# Patient Record
Sex: MALE | Race: WHITE | URBAN - METROPOLITAN AREA
[De-identification: names, ages, dates, MRNs, and addresses within clinical notes are randomized per-mention and may not be internally consistent; named-entity substitution may affect disease eponyms.]

---

## 2021-05-30 ENCOUNTER — RECORDS - HEALTHEAST (OUTPATIENT)
Dept: ADMINISTRATIVE | Facility: CLINIC | Age: 71
End: 2021-05-30

## 2021-05-31 ENCOUNTER — RECORDS - HEALTHEAST (OUTPATIENT)
Dept: ADMINISTRATIVE | Facility: CLINIC | Age: 71
End: 2021-05-31

## 2024-12-12 ENCOUNTER — TRANSITIONAL CARE UNIT VISIT (OUTPATIENT)
Dept: GERIATRICS | Facility: CLINIC | Age: 74
End: 2024-12-12
Payer: COMMERCIAL

## 2024-12-12 ENCOUNTER — DOCUMENTATION ONLY (OUTPATIENT)
Dept: GERIATRICS | Facility: CLINIC | Age: 74
End: 2024-12-12

## 2024-12-12 ENCOUNTER — LAB REQUISITION (OUTPATIENT)
Dept: LAB | Facility: CLINIC | Age: 74
End: 2024-12-12
Payer: COMMERCIAL

## 2024-12-12 VITALS
SYSTOLIC BLOOD PRESSURE: 108 MMHG | BODY MASS INDEX: 45.1 KG/M2 | TEMPERATURE: 97.7 F | WEIGHT: 315 LBS | HEIGHT: 70 IN | HEART RATE: 80 BPM | OXYGEN SATURATION: 94 % | DIASTOLIC BLOOD PRESSURE: 73 MMHG | RESPIRATION RATE: 18 BRPM

## 2024-12-12 DIAGNOSIS — R41.9 NEUROCOGNITIVE DISORDER: ICD-10-CM

## 2024-12-12 DIAGNOSIS — I61.9 INTRAPARENCHYMAL HEMORRHAGE OF BRAIN (H): Primary | ICD-10-CM

## 2024-12-12 DIAGNOSIS — E66.01 MORBID OBESITY (H): ICD-10-CM

## 2024-12-12 DIAGNOSIS — E11.00 TYPE 2 DIABETES MELLITUS WITH HYPEROSMOLARITY WITHOUT COMA, WITHOUT LONG-TERM CURRENT USE OF INSULIN (H): ICD-10-CM

## 2024-12-12 DIAGNOSIS — I10 ESSENTIAL (PRIMARY) HYPERTENSION: ICD-10-CM

## 2024-12-12 DIAGNOSIS — I10 PRIMARY HYPERTENSION: ICD-10-CM

## 2024-12-12 DIAGNOSIS — F17.200 NICOTINE USE DISORDER: ICD-10-CM

## 2024-12-12 PROBLEM — E78.5 DYSLIPIDEMIA: Status: ACTIVE | Noted: 2023-05-10

## 2024-12-12 PROBLEM — E11.9 DIABETES (H): Chronic | Status: ACTIVE | Noted: 2024-12-02

## 2024-12-12 PROBLEM — L85.3 XEROSIS OF SKIN: Status: ACTIVE | Noted: 2024-05-21

## 2024-12-12 PROBLEM — S81.801A LEG WOUND, RIGHT: Status: ACTIVE | Noted: 2024-05-21

## 2024-12-12 PROBLEM — I87.8 VENOUS STASIS: Status: ACTIVE | Noted: 2024-12-11

## 2024-12-12 PROBLEM — I26.02 ACUTE SADDLE PULMONARY EMBOLISM WITH ACUTE COR PULMONALE (H): Status: ACTIVE | Noted: 2024-05-03

## 2024-12-12 PROBLEM — R60.0 LOWER EXTREMITY EDEMA: Status: ACTIVE | Noted: 2024-05-21

## 2024-12-12 PROBLEM — E29.1 HYPOGONADISM MALE: Status: ACTIVE | Noted: 2019-11-12

## 2024-12-12 PROBLEM — E11.9 TYPE 2 DIABETES MELLITUS WITHOUT COMPLICATION (H): Status: ACTIVE | Noted: 2019-12-19

## 2024-12-12 PROBLEM — R90.89 MIDLINE SHIFT OF BRAIN: Status: ACTIVE | Noted: 2024-12-02

## 2024-12-12 PROBLEM — G93.6 CEREBRAL EDEMA (H): Status: ACTIVE | Noted: 2024-12-02

## 2024-12-12 PROBLEM — Z96.653 STATUS POST BILATERAL KNEE REPLACEMENTS: Status: ACTIVE | Noted: 2024-01-09

## 2024-12-12 PROBLEM — B35.1 ONYCHOMYCOSIS: Status: ACTIVE | Noted: 2024-05-21

## 2024-12-12 PROBLEM — Z79.01 LONG TERM (CURRENT) USE OF ANTICOAGULANTS: Status: ACTIVE | Noted: 2024-12-11

## 2024-12-12 PROBLEM — J44.9 COPD (CHRONIC OBSTRUCTIVE PULMONARY DISEASE) (H): Status: ACTIVE | Noted: 2023-06-16

## 2024-12-12 PROBLEM — Z86.711 HISTORY OF PULMONARY EMBOLUS (PE): Status: ACTIVE | Noted: 2020-12-17

## 2024-12-12 RX ORDER — LEVOTHYROXINE SODIUM 200 UG/1
1.5 TABLET ORAL WEEKLY
COMMUNITY

## 2024-12-12 RX ORDER — HYDROCHLOROTHIAZIDE 12.5 MG/1
12.5 TABLET ORAL DAILY
COMMUNITY

## 2024-12-12 RX ORDER — ATORVASTATIN CALCIUM 20 MG/1
20 TABLET, FILM COATED ORAL DAILY
COMMUNITY

## 2024-12-12 RX ORDER — AMLODIPINE BESYLATE 10 MG/1
10 TABLET ORAL DAILY
COMMUNITY

## 2024-12-12 RX ORDER — LEVETIRACETAM 750 MG/1
750 TABLET ORAL 2 TIMES DAILY
COMMUNITY

## 2024-12-12 RX ORDER — LEVOTHYROXINE SODIUM 200 UG/1
200 TABLET ORAL DAILY
COMMUNITY

## 2024-12-12 RX ORDER — ESCITALOPRAM OXALATE 5 MG/1
5 TABLET ORAL DAILY
COMMUNITY

## 2024-12-12 RX ORDER — LOSARTAN POTASSIUM 50 MG/1
50 TABLET ORAL 2 TIMES DAILY
COMMUNITY

## 2024-12-12 NOTE — PROGRESS NOTES
[facility] GERIATRIC SERVICES       Patient Von Romero  MRN: [id number]        Reason for Visit     Chief Complaint   Patient presents with    Hospital F/U       Code Status     CPR/Full code     Assessment     Right temporal intraparenchymal hemorrhage with no underlying pathology identified status post EVD placement and subsequent removal  Discharged with outpatient follow-up with repeat imaging with neurosurgery as scheduled  All antiplatelet and anticoagulant medications are recommended to be held  Seizure prophylaxis on Keppra    Acute fall reported in the TCU this morning when he was self transferring.  No injuries have been reported  Monitor clinically and fall concerns discussed with staff    Hypertension with uncontrolled hypertension in the hospital  Discharged on losartan as well as amlodipine  HCTZ added to the regimen  Blood pressures noted to be on the low side suspect he is not eating and drinking enough   will monitor trends before adjusting medication    Neurocognitive disorder could be a sequelae of intracerebral hemorrhage versus Alzheimer's  Psych saw the patient  Started on Lexapro  Appears to be sleeping however will answer appropriately but does not open eyes for very long    Diabetes type 2 with a last A1c of 5.7  Monitor blood sugars  Continue p.o. metformin and insulin as ordered    History of tobacco abuse -currently not smoking    Chronic pain with opioid dependence he is on methadone  This has been discontinued and currently not on any significant pain medications    Morbid obesity BMI is greater than 47  Monitor weights    Generalized weakness  Discharged to the TCU for strengthening and rehab.  Had a fall this morning with no injuries reported.  Family is looking at alternative placement for him in another TCU        History     Patient is a very pleasant 74 year old male who is admitted to TCU  Patient was admitted with altered mental status and a fall  Imaging was consistent with  right parietal intracerebral hemorrhage  He was transferred and seen by neurosurgery and had a bedside ventricular drain placement done  Patient was extubated and weaned out of the ICU  Eventually discharged to the TCU with advised for an outpatient follow-up with neurosurgery for repeat imaging in 6 weeks  His anticoagulation and antiplatelet therapies are being held   Unfortunately fell again this morning  Family very upset and transferring patient to another facility if possible      Past Medical & Surgical History     PAST MEDICAL HISTORY:   Past Medical History:   Diagnosis Date    Hypercholesterolemia     Hypothyroid     Obesity     Obstructive sleep apnea       PAST SURGICAL HISTORY:   has a past surgical history that includes Ankle surgery (4497-1563); Spine surgery (3537-6324); knee surgery (1993, '04, '07); shoulder surgery (1995); knee surgery (2002, '06); and Release carpal tunnel (1999, '00).      Past Social History     Reviewed,  include ongoing history of tobacco abuse    Family History     Reviewed, and family history is not on file.    Medication List     Current Outpatient Medications   Medication Sig Dispense Refill    amLODIPine (NORVASC) 10 MG tablet Take 10 mg by mouth daily.      atorvastatin (LIPITOR) 20 MG tablet Take 20 mg by mouth daily.      escitalopram (LEXAPRO) 5 MG tablet Take 5 mg by mouth daily.      hydroCHLOROthiazide 12.5 MG tablet Take 12.5 mg by mouth daily.      insulin aspart (NOVOLOG PEN) 100 UNIT/ML pen Inject subcutaneously See Admin Instructions. Inject as per sliding  scale: if 150 - 199 = 2 units; 200 - 249 = 4 units; 250 -  299 = 6 units; 300 - 349 = 8 units; 350 - 399 = 10  units; 400+ = 12 units and notify MD, subcutaneously  with meals for Type 2 diabetes      insulin aspart (NOVOLOG PEN) 100 UNIT/ML pen Inject subcutaneously See Admin Instructions. ) Inject as per sliding  scale: if 200 - 249 = 2 units; 250 - 299 = 3 units; 300 -  349 = 4 units; 350 - 399 = 5  units; 400+ = 6 units and  call MD, subcutaneously at bedtime for Type 2  Diabetes      levETIRAcetam (KEPPRA) 750 MG tablet Take 750 mg by mouth 2 times daily.      levothyroxine (SYNTHROID/LEVOTHROID) 200 MCG tablet Take 200 mcg by mouth daily. Mon, Tues, Wed, Thurs, Fri, sat      levothyroxine (SYNTHROID/LEVOTHROID) 200 MCG tablet Take 1.5 tablets by mouth once a week. Sundays      losartan (COZAAR) 50 MG tablet Take 50 mg by mouth 2 times daily.      metFORMIN (GLUCOPHAGE) 500 MG tablet Take 1,000 mg by mouth 2 times daily (with meals).      naloxone (NARCAN) 1 mg/mL SUSP Take 1 mg by mouth 3 times daily.       No current facility-administered medications for this visit.      MED REC REQUIRED  Post Medication Reconciliation Status: discharge medications reconciled, continue medications without change       Allergies     Allergies   Allergen Reactions    Hydroxyzine Unknown and Other (See Comments)     Other Reaction(s): Mood swings    Mood swings       Review of Systems   A comprehensive review of 14 systems was done. Pertinent findings noted here and in history of present illness. All the rest negative.  Constitutional: Negative.  Negative for fever, chills, he has  activity change, appetite change and fatigue.   HENT: Negative for congestion and facial swelling.    Eyes: Negative for photophobia, redness and visual disturbance.   Respiratory: Negative for cough and chest tightness.    Cardiovascular: Negative for chest pain, palpitations and leg swelling.   Gastrointestinal: Negative for nausea, diarrhea, constipation, blood in stool and abdominal distention.   Genitourinary: Negative.    Musculoskeletal:   Fell this morning  Skin: Negative.    Neurological: Negative for dizziness, tremors, syncope, weakness, light-headedness and headaches.   Hematological: Does not bruise/bleed easily.   Psychiatric/Behavioral: Negative.  Appears to be sleepy and with minimal eye-opening      Physical Exam   /73    "Pulse 80   Temp 97.7  F (36.5  C)   Resp 18   Ht 1.778 m (5' 10\")   Wt 149.7 kg (330 lb)   SpO2 94%   BMI 47.35 kg/m     GENERAL: no acute distress. Cooperative in conversation.   HEENT: pupils are equal, round and reactive. Oral mucosa is moist and intact.  RESP:Chest symmetric. Regular respiratory rate. No stridor.  CVS S1-S2  ABD: Nondistended, soft.  Obese  EXTREMITIES: No lower extremity edema.   NEURO: non focal. Alert and oriented x3.   PSYCH: within normal limits. No depression or anxiety.  Appears to be sleepy with minimal eye-opening noted  SKIN: warm dry intact    Rash in skin folds  Scalp incision noted intact with sutures noted      Lab Results       CT head  Large 4.4 cm x 4.5 cm x 5.4 cm right temporal lobe hemorrhage with significant cerebral edema along with intraventricular hemorrhage. On coumarin, reversed with Kcentra.            Electronically signed by    Zara Shelley MD                        "

## 2024-12-12 NOTE — LETTER
12/12/2024      Von Romero  [address]  [place] [address]        M [facility] GERIATRIC SERVICES       Patient Von Romero  MRN: [id number]        Reason for Visit     Chief Complaint   Patient presents with     Hospital F/U       Code Status     CPR/Full code     Assessment     Right temporal intraparenchymal hemorrhage with no underlying pathology identified status post EVD placement and subsequent removal  Discharged with outpatient follow-up with repeat imaging with neurosurgery as scheduled  All antiplatelet and anticoagulant medications are recommended to be held  Seizure prophylaxis on Keppra    Acute fall reported in the TCU this morning when he was self transferring.  No injuries have been reported  Monitor clinically and fall concerns discussed with staff    Hypertension with uncontrolled hypertension in the hospital  Discharged on losartan as well as amlodipine  HCTZ added to the regimen  Blood pressures noted to be on the low side suspect he is not eating and drinking enough   will monitor trends before adjusting medication    Neurocognitive disorder could be a sequelae of intracerebral hemorrhage versus Alzheimer's  Psych saw the patient  Started on Lexapro  Appears to be sleeping however will answer appropriately but does not open eyes for very long    Diabetes type 2 with a last A1c of 5.7  Monitor blood sugars  Continue p.o. metformin and insulin as ordered    History of tobacco abuse -currently not smoking    Chronic pain with opioid dependence he is on methadone  This has been discontinued and currently not on any significant pain medications    Morbid obesity BMI is greater than 47  Monitor weights    Generalized weakness  Discharged to the TCU for strengthening and rehab.  Had a fall this morning with no injuries reported.  Family is looking at alternative placement for him in another TCU        History     Patient is a very pleasant 74 year old male who is admitted to  TCU  Patient was admitted with altered mental status and a fall  Imaging was consistent with right parietal intracerebral hemorrhage  He was transferred and seen by neurosurgery and had a bedside ventricular drain placement done  Patient was extubated and weaned out of the ICU  Eventually discharged to the TCU with advised for an outpatient follow-up with neurosurgery for repeat imaging in 6 weeks  His anticoagulation and antiplatelet therapies are being held   Unfortunately fell again this morning  Family very upset and transferring patient to another facility if possible      Past Medical & Surgical History     PAST MEDICAL HISTORY:   Past Medical History:   Diagnosis Date     Hypercholesterolemia      Hypothyroid      Obesity      Obstructive sleep apnea       PAST SURGICAL HISTORY:   has a past surgical history that includes Ankle surgery (9095-4423); Spine surgery (0171-7647); knee surgery (1993, '04, '07); shoulder surgery (1995); knee surgery (2002, '06); and Release carpal tunnel (1999, '00).      Past Social History     Reviewed,  include ongoing history of tobacco abuse    Family History     Reviewed, and family history is not on file.    Medication List     Current Outpatient Medications   Medication Sig Dispense Refill     amLODIPine (NORVASC) 10 MG tablet Take 10 mg by mouth daily.       atorvastatin (LIPITOR) 20 MG tablet Take 20 mg by mouth daily.       escitalopram (LEXAPRO) 5 MG tablet Take 5 mg by mouth daily.       hydroCHLOROthiazide 12.5 MG tablet Take 12.5 mg by mouth daily.       insulin aspart (NOVOLOG PEN) 100 UNIT/ML pen Inject subcutaneously See Admin Instructions. Inject as per sliding  scale: if 150 - 199 = 2 units; 200 - 249 = 4 units; 250 -  299 = 6 units; 300 - 349 = 8 units; 350 - 399 = 10  units; 400+ = 12 units and notify MD, subcutaneously  with meals for Type 2 diabetes       insulin aspart (NOVOLOG PEN) 100 UNIT/ML pen Inject subcutaneously See Admin Instructions. ) Inject as  per sliding  scale: if 200 - 249 = 2 units; 250 - 299 = 3 units; 300 -  349 = 4 units; 350 - 399 = 5 units; 400+ = 6 units and  call MD, subcutaneously at bedtime for Type 2  Diabetes       levETIRAcetam (KEPPRA) 750 MG tablet Take 750 mg by mouth 2 times daily.       levothyroxine (SYNTHROID/LEVOTHROID) 200 MCG tablet Take 200 mcg by mouth daily. Mon, Tues, Wed, Thurs, Fri, sat       levothyroxine (SYNTHROID/LEVOTHROID) 200 MCG tablet Take 1.5 tablets by mouth once a week. Sundays       losartan (COZAAR) 50 MG tablet Take 50 mg by mouth 2 times daily.       metFORMIN (GLUCOPHAGE) 500 MG tablet Take 1,000 mg by mouth 2 times daily (with meals).       naloxone (NARCAN) 1 mg/mL SUSP Take 1 mg by mouth 3 times daily.       No current facility-administered medications for this visit.      MED REC REQUIRED  Post Medication Reconciliation Status: discharge medications reconciled, continue medications without change       Allergies     Allergies   Allergen Reactions     Hydroxyzine Unknown and Other (See Comments)     Other Reaction(s): Mood swings    Mood swings       Review of Systems   A comprehensive review of 14 systems was done. Pertinent findings noted here and in history of present illness. All the rest negative.  Constitutional: Negative.  Negative for fever, chills, he has  activity change, appetite change and fatigue.   HENT: Negative for congestion and facial swelling.    Eyes: Negative for photophobia, redness and visual disturbance.   Respiratory: Negative for cough and chest tightness.    Cardiovascular: Negative for chest pain, palpitations and leg swelling.   Gastrointestinal: Negative for nausea, diarrhea, constipation, blood in stool and abdominal distention.   Genitourinary: Negative.    Musculoskeletal:   Fell this morning  Skin: Negative.    Neurological: Negative for dizziness, tremors, syncope, weakness, light-headedness and headaches.   Hematological: Does not bruise/bleed easily.  "  Psychiatric/Behavioral: Negative.  Appears to be sleepy and with minimal eye-opening      Physical Exam   /73   Pulse 80   Temp 97.7  F (36.5  C)   Resp 18   Ht 1.778 m (5' 10\")   Wt 149.7 kg (330 lb)   SpO2 94%   BMI 47.35 kg/m     GENERAL: no acute distress. Cooperative in conversation.   HEENT: pupils are equal, round and reactive. Oral mucosa is moist and intact.  RESP:Chest symmetric. Regular respiratory rate. No stridor.  CVS S1-S2  ABD: Nondistended, soft.  Obese  EXTREMITIES: No lower extremity edema.   NEURO: non focal. Alert and oriented x3.   PSYCH: within normal limits. No depression or anxiety.  Appears to be sleepy with minimal eye-opening noted  SKIN: warm dry intact    Rash in skin folds  Scalp incision noted intact with sutures noted      Lab Results       CT head  Large 4.4 cm x 4.5 cm x 5.4 cm right temporal lobe hemorrhage with significant cerebral edema along with intraventricular hemorrhage. On coumarin, reversed with Kcentra.            Electronically signed by    Zara Shelley MD                            Sincerely,        EDWAR Rivas      "

## 2024-12-16 ENCOUNTER — TELEPHONE (OUTPATIENT)
Dept: GERIATRICS | Facility: CLINIC | Age: 74
End: 2024-12-16

## 2024-12-16 LAB
ANION GAP SERPL CALCULATED.3IONS-SCNC: 16 MMOL/L (ref 7–15)
BUN SERPL-MCNC: 25.5 MG/DL (ref 8–23)
CALCIUM SERPL-MCNC: 9 MG/DL (ref 8.8–10.4)
CHLORIDE SERPL-SCNC: 98 MMOL/L (ref 98–107)
CREAT SERPL-MCNC: 2.04 MG/DL (ref 0.67–1.17)
EGFRCR SERPLBLD CKD-EPI 2021: 34 ML/MIN/1.73M2
ERYTHROCYTE [DISTWIDTH] IN BLOOD BY AUTOMATED COUNT: 16.7 % (ref 10–15)
GLUCOSE SERPL-MCNC: 118 MG/DL (ref 70–99)
HCO3 SERPL-SCNC: 23 MMOL/L (ref 22–29)
HCT VFR BLD AUTO: 41.5 % (ref 40–53)
HGB BLD-MCNC: 13.6 G/DL (ref 13.3–17.7)
MAGNESIUM SERPL-MCNC: 2 MG/DL (ref 1.7–2.3)
MCH RBC QN AUTO: 30.4 PG (ref 26.5–33)
MCHC RBC AUTO-ENTMCNC: 32.8 G/DL (ref 31.5–36.5)
MCV RBC AUTO: 93 FL (ref 78–100)
PLATELET # BLD AUTO: 268 10E3/UL (ref 150–450)
POTASSIUM SERPL-SCNC: 3.1 MMOL/L (ref 3.4–5.3)
RBC # BLD AUTO: 4.47 10E6/UL (ref 4.4–5.9)
SODIUM SERPL-SCNC: 137 MMOL/L (ref 135–145)
WBC # BLD AUTO: 11.8 10E3/UL (ref 4–11)

## 2024-12-16 PROCEDURE — 80048 BASIC METABOLIC PNL TOTAL CA: CPT | Mod: ORL | Performed by: FAMILY MEDICINE

## 2024-12-16 PROCEDURE — 83735 ASSAY OF MAGNESIUM: CPT | Mod: ORL | Performed by: FAMILY MEDICINE

## 2024-12-16 PROCEDURE — P9604 ONE-WAY ALLOW PRORATED TRIP: HCPCS | Mod: ORL | Performed by: FAMILY MEDICINE

## 2024-12-16 PROCEDURE — 36415 COLL VENOUS BLD VENIPUNCTURE: CPT | Mod: ORL | Performed by: FAMILY MEDICINE

## 2024-12-16 PROCEDURE — 85027 COMPLETE CBC AUTOMATED: CPT | Mod: ORL | Performed by: FAMILY MEDICINE

## 2024-12-17 ENCOUNTER — LAB REQUISITION (OUTPATIENT)
Dept: LAB | Facility: CLINIC | Age: 74
End: 2024-12-17
Payer: COMMERCIAL

## 2024-12-17 DIAGNOSIS — I10 ESSENTIAL (PRIMARY) HYPERTENSION: ICD-10-CM

## 2024-12-18 ENCOUNTER — TELEPHONE (OUTPATIENT)
Dept: GERIATRICS | Facility: CLINIC | Age: 74
End: 2024-12-18

## 2024-12-18 ENCOUNTER — LAB REQUISITION (OUTPATIENT)
Dept: LAB | Facility: CLINIC | Age: 74
End: 2024-12-18
Payer: COMMERCIAL

## 2024-12-18 DIAGNOSIS — R41.82 ALTERED MENTAL STATUS, UNSPECIFIED: ICD-10-CM

## 2024-12-18 DIAGNOSIS — J11.1 INFLUENZA DUE TO UNIDENTIFIED INFLUENZA VIRUS WITH OTHER RESPIRATORY MANIFESTATIONS: ICD-10-CM

## 2024-12-18 DIAGNOSIS — B97.4 RESPIRATORY SYNCYTIAL VIRUS AS THE CAUSE OF DISEASES CLASSIFIED ELSEWHERE: ICD-10-CM

## 2024-12-18 LAB
ALBUMIN UR-MCNC: 20 MG/DL
ANION GAP SERPL CALCULATED.3IONS-SCNC: 19 MMOL/L (ref 7–15)
APPEARANCE UR: ABNORMAL
BILIRUB UR QL STRIP: NEGATIVE
BUN SERPL-MCNC: 36.8 MG/DL (ref 8–23)
CALCIUM SERPL-MCNC: 9.2 MG/DL (ref 8.8–10.4)
CHLORIDE SERPL-SCNC: 103 MMOL/L (ref 98–107)
COLOR UR AUTO: YELLOW
CREAT SERPL-MCNC: 1.93 MG/DL (ref 0.67–1.17)
EGFRCR SERPLBLD CKD-EPI 2021: 36 ML/MIN/1.73M2
GLUCOSE SERPL-MCNC: 102 MG/DL (ref 70–99)
GLUCOSE UR STRIP-MCNC: NEGATIVE MG/DL
HCO3 SERPL-SCNC: 16 MMOL/L (ref 22–29)
HGB UR QL STRIP: NEGATIVE
HYALINE CASTS: 3 /LPF
KETONES UR STRIP-MCNC: ABNORMAL MG/DL
LEUKOCYTE ESTERASE UR QL STRIP: NEGATIVE
MUCOUS THREADS #/AREA URNS LPF: PRESENT /LPF
NITRATE UR QL: NEGATIVE
PH UR STRIP: 5 [PH] (ref 5–7)
POTASSIUM SERPL-SCNC: 4.7 MMOL/L (ref 3.4–5.3)
RBC URINE: 3 /HPF
SODIUM SERPL-SCNC: 138 MMOL/L (ref 135–145)
SP GR UR STRIP: 1.02 (ref 1–1.03)
SQUAMOUS EPITHELIAL: 1 /HPF
UROBILINOGEN UR STRIP-MCNC: NORMAL MG/DL
WBC URINE: 3 /HPF

## 2024-12-18 PROCEDURE — 36415 COLL VENOUS BLD VENIPUNCTURE: CPT | Mod: ORL | Performed by: FAMILY MEDICINE

## 2024-12-18 PROCEDURE — 81001 URINALYSIS AUTO W/SCOPE: CPT | Mod: ORL | Performed by: FAMILY MEDICINE

## 2024-12-18 PROCEDURE — 80048 BASIC METABOLIC PNL TOTAL CA: CPT | Mod: ORL | Performed by: FAMILY MEDICINE

## 2024-12-18 PROCEDURE — P9603 ONE-WAY ALLOW PRORATED MILES: HCPCS | Mod: ORL | Performed by: FAMILY MEDICINE

## 2024-12-18 NOTE — TELEPHONE ENCOUNTER
[facility] Note     Provider: Zara Shelley MD  Facility: [facility]  Facility Type:  TCU    Allergies   Allergen Reactions    Hydroxyzine Unknown and Other (See Comments)     Other Reaction(s): Mood swings    Mood swings       Labs Reviewed by provider: SURY     Verbal Order/Direction given by Provider:   Continue to push fluids  Monitor diarrhea  Recheck BMP in 1 week - 12/26/24    Provider giving Order:  Zara Shelley MD    Verbal Order given to: Shruthi Lott RN

## 2024-12-19 ENCOUNTER — LAB REQUISITION (OUTPATIENT)
Dept: LAB | Facility: CLINIC | Age: 74
End: 2024-12-19
Payer: COMMERCIAL

## 2024-12-19 DIAGNOSIS — A04.72 ENTEROCOLITIS DUE TO CLOSTRIDIUM DIFFICILE, NOT SPECIFIED AS RECURRENT: ICD-10-CM

## 2024-12-19 DIAGNOSIS — A08.11 ACUTE GASTROENTEROPATHY DUE TO NORWALK AGENT: ICD-10-CM

## 2024-12-19 LAB — C DIFF TOX B STL QL: NEGATIVE

## 2024-12-19 PROCEDURE — 87507 IADNA-DNA/RNA PROBE TQ 12-25: CPT | Mod: ORL | Performed by: FAMILY MEDICINE

## 2024-12-20 ENCOUNTER — LAB REQUISITION (OUTPATIENT)
Dept: LAB | Facility: CLINIC | Age: 74
End: 2024-12-20
Payer: COMMERCIAL

## 2024-12-20 DIAGNOSIS — A08.11 ACUTE GASTROENTEROPATHY DUE TO NORWALK AGENT: ICD-10-CM

## 2024-12-20 LAB
ADV 40+41 DNA STL QL NAA+NON-PROBE: NEGATIVE
ASTRO TYP 1-8 RNA STL QL NAA+NON-PROBE: NEGATIVE
C CAYETANENSIS DNA STL QL NAA+NON-PROBE: NEGATIVE
CAMPYLOBACTER DNA SPEC NAA+PROBE: NEGATIVE
CRYPTOSP DNA STL QL NAA+NON-PROBE: NEGATIVE
E COLI O157 DNA STL QL NAA+NON-PROBE: NORMAL
E HISTOLYT DNA STL QL NAA+NON-PROBE: NEGATIVE
EAEC ASTA GENE ISLT QL NAA+PROBE: NEGATIVE
EC STX1+STX2 GENES STL QL NAA+NON-PROBE: NEGATIVE
EPEC EAE GENE STL QL NAA+NON-PROBE: NEGATIVE
ETEC LTA+ST1A+ST1B TOX ST NAA+NON-PROBE: NEGATIVE
G LAMBLIA DNA STL QL NAA+NON-PROBE: NEGATIVE
NOROVIRUS GI+II RNA STL QL NAA+NON-PROBE: NEGATIVE
P SHIGELLOIDES DNA STL QL NAA+NON-PROBE: NEGATIVE
RVA RNA STL QL NAA+NON-PROBE: NEGATIVE
SALMONELLA SP RPOD STL QL NAA+PROBE: NEGATIVE
SAPO I+II+IV+V RNA STL QL NAA+NON-PROBE: NEGATIVE
SHIGELLA SP+EIEC IPAH ST NAA+NON-PROBE: NEGATIVE
V CHOLERAE DNA SPEC QL NAA+PROBE: NEGATIVE
VIBRIO DNA SPEC NAA+PROBE: NEGATIVE
Y ENTEROCOL DNA STL QL NAA+PROBE: NEGATIVE

## 2024-12-23 ENCOUNTER — TELEPHONE (OUTPATIENT)
Dept: GERIATRICS | Facility: CLINIC | Age: 74
End: 2024-12-23
Payer: COMMERCIAL

## 2024-12-23 RX ORDER — LOPERAMIDE HYDROCHLORIDE 2 MG/1
4 CAPSULE ORAL DAILY
COMMUNITY

## 2024-12-23 NOTE — TELEPHONE ENCOUNTER
Note reviewed. Seems ongoing the last couple of days. Stool tests reviewed and all negative. BS stable.   Please Hold Metformin to see if contributor? until further notice. Continue insulin/sliding scale insulin.  Start Imodium 4mg po every day and 2mg po BID PRN.    Close monitor.    Electronically signed by  MADISON Leon, TAMIKO/ANP-BC

## 2024-12-23 NOTE — TELEPHONE ENCOUNTER
"[facility] Triage Call    Provider: Zara Shelley MD  Facility: [facility]  Facility Type:  TCU    Caller: Urvashi  Call Back Number: [phone]    Allergies:    Allergies   Allergen Reactions    Hydroxyzine Unknown and Other (See Comments)     Other Reaction(s): Mood swings    Mood swings        SBAR:     S-(situation): Ongoing diarrhea    B-(background): Pt began having loose stools since last week. C.diff culture negative on 12/19. Not currently taking Senna or other stool softeners. Currently on Metformin 1000mg BID.    A-(assessment): Loose stools 5x today. Denies N/V, no abdominal cramping/pain. Abdomen soft, non-distended. Drinking fluids well.   Vitals: BP:  92/54  P:: 92  Temp.:  97.7      R-(recommendations): BMP is scheduled for 12/26. Imodium? Adjust Metformin?        Telephone encounter sent to:  MADISON Leon CNP    Please send response/orders to \"Geriatrics Nurse Pool\"    Deanna Dao RN      "

## 2024-12-24 ENCOUNTER — LAB REQUISITION (OUTPATIENT)
Dept: LAB | Facility: CLINIC | Age: 74
End: 2024-12-24
Payer: COMMERCIAL

## 2024-12-24 DIAGNOSIS — R19.7 DIARRHEA, UNSPECIFIED: ICD-10-CM

## 2024-12-24 LAB
FLUAV RNA SPEC QL NAA+PROBE: NEGATIVE
FLUBV RNA RESP QL NAA+PROBE: NEGATIVE
RSV RNA SPEC NAA+PROBE: NEGATIVE
SARS-COV-2 RNA RESP QL NAA+PROBE: NEGATIVE

## 2024-12-24 PROCEDURE — 87637 SARSCOV2&INF A&B&RSV AMP PRB: CPT | Mod: ORL | Performed by: INTERNAL MEDICINE

## 2024-12-24 PROCEDURE — 87637 SARSCOV2&INF A&B&RSV AMP PRB: CPT | Mod: ORL

## 2024-12-29 ENCOUNTER — TELEPHONE (OUTPATIENT)
Dept: GERIATRICS | Facility: CLINIC | Age: 74
End: 2024-12-29
Payer: COMMERCIAL

## 2024-12-29 NOTE — TELEPHONE ENCOUNTER
[facility]ERIATRIC [facility] TRIAGE ENCOUNTER    Chief Complaint   Patient presents with    Hypotension       Von Romero is a 74 year old  (1950), Nurse called today to report: Patients blood pressure tonight was 76/58. He received amlodipine and htcz this AM. BP was not checked prior to giving.     ASSESSMENT/PLAN  Push fluids today  Add hold parameters to Amlodipine and hydrochlorothiazide to hold for SBP <110    Electronically signed by:   Martha Bond NP

## 2024-12-30 ENCOUNTER — TRANSITIONAL CARE UNIT VISIT (OUTPATIENT)
Dept: GERIATRICS | Facility: CLINIC | Age: 74
End: 2024-12-30
Payer: COMMERCIAL

## 2024-12-30 VITALS
WEIGHT: 315 LBS | RESPIRATION RATE: 18 BRPM | OXYGEN SATURATION: 94 % | SYSTOLIC BLOOD PRESSURE: 76 MMHG | HEART RATE: 60 BPM | DIASTOLIC BLOOD PRESSURE: 46 MMHG | BODY MASS INDEX: 45.1 KG/M2 | TEMPERATURE: 98.7 F | HEIGHT: 70 IN

## 2024-12-30 DIAGNOSIS — J44.9 CHRONIC OBSTRUCTIVE PULMONARY DISEASE, UNSPECIFIED COPD TYPE (H): ICD-10-CM

## 2024-12-30 DIAGNOSIS — J96.21 ACUTE ON CHRONIC RESPIRATORY FAILURE WITH HYPOXIA (H): Primary | ICD-10-CM

## 2024-12-30 DIAGNOSIS — I61.9 INTRAPARENCHYMAL HEMORRHAGE OF BRAIN (H): ICD-10-CM

## 2024-12-30 PROBLEM — R53.81 OTHER MALAISE: Status: ACTIVE | Noted: 2020-10-16

## 2024-12-30 PROBLEM — M54.9 DORSALGIA, UNSPECIFIED: Status: ACTIVE | Noted: 2020-10-16

## 2024-12-30 PROBLEM — G89.29 OTHER CHRONIC PAIN: Status: ACTIVE | Noted: 2020-10-16

## 2024-12-30 PROBLEM — E24.9: Status: ACTIVE | Noted: 2021-09-21

## 2024-12-30 PROBLEM — Z98.1 ARTHRODESIS STATUS: Status: ACTIVE | Noted: 2020-10-16

## 2024-12-30 PROBLEM — Z90.89 ACQUIRED ABSENCE OF OTHER ORGANS: Status: ACTIVE | Noted: 2020-10-16

## 2024-12-30 PROBLEM — R26.81 UNSTEADINESS ON FEET: Status: ACTIVE | Noted: 2020-10-16

## 2024-12-30 PROBLEM — Z96.653 PRESENCE OF ARTIFICIAL KNEE JOINT, BILATERAL: Status: ACTIVE | Noted: 2020-10-16

## 2024-12-30 PROBLEM — R26.2 DIFFICULTY IN WALKING, NOT ELSEWHERE CLASSIFIED: Status: ACTIVE | Noted: 2020-10-16

## 2024-12-30 PROBLEM — Z99.89 DEPENDENCE ON OTHER ENABLING MACHINES AND DEVICES: Status: ACTIVE | Noted: 2020-10-16

## 2024-12-30 PROBLEM — J96.91 RESPIRATORY FAILURE, UNSPECIFIED WITH HYPOXIA (H): Status: ACTIVE | Noted: 2020-10-16

## 2024-12-30 PROBLEM — Z79.891 LONG TERM (CURRENT) USE OF OPIATE ANALGESIC: Status: ACTIVE | Noted: 2020-10-16

## 2024-12-30 PROCEDURE — 99310 SBSQ NF CARE HIGH MDM 45: CPT | Performed by: NURSE PRACTITIONER

## 2024-12-30 NOTE — LETTER
12/30/2024      Von Romero  [address] [address]        M [facility] GERIATRICS    PRIMARY CARE PROVIDER AND CLINIC:  Earl Billings MD (Inactive), XXX RETIRED XXX / [place] [address]  Chief Complaint   Patient presents with     MANDY      [facility] Medical Record Number:  [id number]  Place of Service where encounter took place:  [facility] [[id number]]    Von Romero  is a 74 year old  (1950) seen today for initial NP visit in TCU:  1. Acute on chronic respiratory failure with hypoxia (H)    2. Intraparenchymal hemorrhage of brain (H)    3. Chronic obstructive pulmonary disease, unspecified COPD type (H)        HPI:    Nursing asked me to see patient today for difficulty breathing. Nursing reports this is ongoing however breathing has changed. Patient is requesting to be sent to ED as he is unable to breath.  He is taking oxygen off. BP low 82/53, he was given IVF in TCU.      CODE STATUS/ADVANCE DIRECTIVES DISCUSSION:  Full Code  CPR/Full code   ALLERGIES:   Allergies   Allergen Reactions     Hydroxyzine Unknown and Other (See Comments)     Other Reaction(s): Mood swings    Mood swings      PAST MEDICAL HISTORY:   Past Medical History:   Diagnosis Date     Hypercholesterolemia      Hypothyroid      Obesity      Obstructive sleep apnea       PAST SURGICAL HISTORY:   has a past surgical history that includes Ankle surgery (4042-6012); Spine surgery (6395-5281); knee surgery (1993, '04, '07); shoulder surgery (1995); knee surgery (2002, '06); and Release carpal tunnel (1999, '00).  FAMILY HISTORY: family history is not on file.  SOCIAL HISTORY:     Patient's living condition:       Post Discharge Medication Reconciliation Status:   MED REC REQUIRED    Post Medication Reconciliation Status: discharge medications reconciled, continue medications without change       Current Outpatient Medications   Medication Sig Dispense Refill     amLODIPine (NORVASC) 10  "MG tablet Take 10 mg by mouth daily.       atorvastatin (LIPITOR) 20 MG tablet Take 20 mg by mouth daily.       escitalopram (LEXAPRO) 5 MG tablet Take 5 mg by mouth daily.       hydroCHLOROthiazide 12.5 MG tablet Take 12.5 mg by mouth daily.       insulin aspart (NOVOLOG PEN) 100 UNIT/ML pen Inject subcutaneously See Admin Instructions. Inject as per sliding  scale: if 150 - 199 = 2 units; 200 - 249 = 4 units; 250 -  299 = 6 units; 300 - 349 = 8 units; 350 - 399 = 10  units; 400+ = 12 units and notify MD, subcutaneously  with meals for Type 2 diabetes       insulin aspart (NOVOLOG PEN) 100 UNIT/ML pen Inject subcutaneously See Admin Instructions. ) Inject as per sliding  scale: if 200 - 249 = 2 units; 250 - 299 = 3 units; 300 -  349 = 4 units; 350 - 399 = 5 units; 400+ = 6 units and  call MD, subcutaneously at bedtime for Type 2  Diabetes       levETIRAcetam (KEPPRA) 750 MG tablet Take 750 mg by mouth 2 times daily.       levothyroxine (SYNTHROID/LEVOTHROID) 200 MCG tablet Take 200 mcg by mouth daily. Mon, Tues, Wed, Thurs, Fri, sat       levothyroxine (SYNTHROID/LEVOTHROID) 200 MCG tablet Take 1.5 tablets by mouth once a week. Sundays       loperamide (IMODIUM) 2 MG capsule Take 4 mg by mouth daily. May also take 2mg BID PRN       losartan (COZAAR) 50 MG tablet Take 50 mg by mouth 2 times daily.       metFORMIN (GLUCOPHAGE) 500 MG tablet Take 1,000 mg by mouth 2 times daily (with meals).       naloxone (NARCAN) 1 mg/mL SUSP Take 1 mg by mouth 3 times daily.       No current facility-administered medications for this visit.       ROS:  4 point ROS including Respiratory, CV, GI and , other than that noted in the HPI,  is negative    Vitals:  BP (!) 76/46   Pulse 60   Temp 98.7  F (37.1  C)   Resp 18   Ht 1.778 m (5' 10\")   Wt 148.3 kg (327 lb)   SpO2 94%   BMI 46.92 kg/m    Exam:  GENERAL APPEARANCE:  Shallow breathing  ENT:  Mouth and posterior oropharynx normal, moist mucous membranes  EYES:  unable to " "open  NECK:  No adenopathy,masses or thyromegaly  RESP:  difficulty breathing, respiratory distress. Removing oxygen.   CV:  Palpation and auscultation of heart done , regular rate and rhythm, no murmur, rub, or gallop,   GI/ABDOMEN:  normal bowel sounds, soft, nontender, bowel sounds active   M/S:   limited movement   SKIN:  no rashes to exposed skin.   NEURO:   intact   PSYCH:  oriented X 2,       Lab/Diagnostic data:  Recent labs in [facility] reviewed by me today.     Last Comprehensive Metabolic Panel:  Lab Results   Component Value Date     12/18/2024    POTASSIUM 4.7 12/18/2024    CHLORIDE 103 12/18/2024    CO2 16 (L) 12/18/2024    ANIONGAP 19 (H) 12/18/2024     (H) 12/18/2024    BUN 36.8 (H) 12/18/2024    CR 1.93 (H) 12/18/2024    GFRESTIMATED 36 (L) 12/18/2024    MILLY 9.2 12/18/2024       Lab Results   Component Value Date    WBC 11.8 12/16/2024     Lab Results   Component Value Date    RBC 4.47 12/16/2024     Lab Results   Component Value Date    HGB 13.6 12/16/2024     Lab Results   Component Value Date    HCT 41.5 12/16/2024     No components found for: \"MCT\"  Lab Results   Component Value Date    MCV 93 12/16/2024     Lab Results   Component Value Date    MCH 30.4 12/16/2024     Lab Results   Component Value Date    MCHC 32.8 12/16/2024     Lab Results   Component Value Date    RDW 16.7 12/16/2024     Lab Results   Component Value Date     12/16/2024       ASSESSMENT/PLAN:    (J96.21) Acute on chronic respiratory failure with hypoxia (H)  (primary encounter diagnosis)  (J44.9) Chronic obstructive pulmonary disease, unspecified COPD type (H)  Patient refusing to wear oxygen, keeps taking off. Difficulty breathing  Send to ED for evaluation, patient is full code    (I61.9) Intraparenchymal hemorrhage of brain (H)  Right temporal intraparenchymal hemorrhage with no underlying pathology identified status post EVD placement and subsequent removal  Discharged with outpatient follow-up with repeat " imaging with neurosurgery as scheduled  All antiplatelet and anticoagulant medications are recommended to be held  Seizure prophylaxis on Keppra          Electronically signed by:  Lana Woodson CNP       Total time greater than 45 minutes reviewing chart in EPIC and PCC, medications, labs, vitals. Discussing with social work, physical therapy, occupational therapy and nursing.                     Sincerely,        Lana Woodson CNP    Electronically signed

## 2024-12-30 NOTE — PROGRESS NOTES
[facility] GERIATRICS    PRIMARY CARE PROVIDER AND CLINIC:  Earl Billings MD (Inactive), XXX RETIRED XXX / [place] [address]  Chief Complaint   Patient presents with    MANDY      [facility] Medical Record Number:  [id number]  Place of Service where encounter took place:  [facility] ([facility]) [[id number]]    Von Romero  is a 74 year old  (1950) seen today for initial NP visit in TCU:  1. Acute on chronic respiratory failure with hypoxia (H)    2. Intraparenchymal hemorrhage of brain (H)    3. Chronic obstructive pulmonary disease, unspecified COPD type (H)        HPI:    Nursing asked me to see patient today for difficulty breathing. Nursing reports this is ongoing however breathing has changed. Patient is requesting to be sent to ED as he is unable to breath.  He is taking oxygen off. BP low 82/53, he was given IVF in TCU.      CODE STATUS/ADVANCE DIRECTIVES DISCUSSION:  Full Code  CPR/Full code   ALLERGIES:   Allergies   Allergen Reactions    Hydroxyzine Unknown and Other (See Comments)     Other Reaction(s): Mood swings    Mood swings      PAST MEDICAL HISTORY:   Past Medical History:   Diagnosis Date    Hypercholesterolemia     Hypothyroid     Obesity     Obstructive sleep apnea       PAST SURGICAL HISTORY:   has a past surgical history that includes Ankle surgery (8860-1900); Spine surgery (4308-9935); knee surgery (1993, '04, '07); shoulder surgery (1995); knee surgery (2002, '06); and Release carpal tunnel (1999, '00).  FAMILY HISTORY: family history is not on file.  SOCIAL HISTORY:     Patient's living condition:       Post Discharge Medication Reconciliation Status:   MED REC REQUIRED    Post Medication Reconciliation Status: discharge medications reconciled, continue medications without change       Current Outpatient Medications   Medication Sig Dispense Refill    amLODIPine (NORVASC) 10 MG tablet Take 10 mg by mouth daily.      atorvastatin (LIPITOR) 20 MG tablet Take 20  "mg by mouth daily.      escitalopram (LEXAPRO) 5 MG tablet Take 5 mg by mouth daily.      hydroCHLOROthiazide 12.5 MG tablet Take 12.5 mg by mouth daily.      insulin aspart (NOVOLOG PEN) 100 UNIT/ML pen Inject subcutaneously See Admin Instructions. Inject as per sliding  scale: if 150 - 199 = 2 units; 200 - 249 = 4 units; 250 -  299 = 6 units; 300 - 349 = 8 units; 350 - 399 = 10  units; 400+ = 12 units and notify MD, subcutaneously  with meals for Type 2 diabetes      insulin aspart (NOVOLOG PEN) 100 UNIT/ML pen Inject subcutaneously See Admin Instructions. ) Inject as per sliding  scale: if 200 - 249 = 2 units; 250 - 299 = 3 units; 300 -  349 = 4 units; 350 - 399 = 5 units; 400+ = 6 units and  call MD, subcutaneously at bedtime for Type 2  Diabetes      levETIRAcetam (KEPPRA) 750 MG tablet Take 750 mg by mouth 2 times daily.      levothyroxine (SYNTHROID/LEVOTHROID) 200 MCG tablet Take 200 mcg by mouth daily. Mon, Tues, Wed, Thurs, Fri, sat      levothyroxine (SYNTHROID/LEVOTHROID) 200 MCG tablet Take 1.5 tablets by mouth once a week. Sundays      loperamide (IMODIUM) 2 MG capsule Take 4 mg by mouth daily. May also take 2mg BID PRN      losartan (COZAAR) 50 MG tablet Take 50 mg by mouth 2 times daily.      metFORMIN (GLUCOPHAGE) 500 MG tablet Take 1,000 mg by mouth 2 times daily (with meals).      naloxone (NARCAN) 1 mg/mL SUSP Take 1 mg by mouth 3 times daily.       No current facility-administered medications for this visit.       ROS:  4 point ROS including Respiratory, CV, GI and , other than that noted in the HPI,  is negative    Vitals:  BP (!) 76/46   Pulse 60   Temp 98.7  F (37.1  C)   Resp 18   Ht 1.778 m (5' 10\")   Wt 148.3 kg (327 lb)   SpO2 94%   BMI 46.92 kg/m    Exam:  GENERAL APPEARANCE:  Shallow breathing  ENT:  Mouth and posterior oropharynx normal, moist mucous membranes  EYES:  unable to open  NECK:  No adenopathy,masses or thyromegaly  RESP:  difficulty breathing, respiratory distress. " "Removing oxygen.   CV:  Palpation and auscultation of heart done , regular rate and rhythm, no murmur, rub, or gallop,   GI/ABDOMEN:  normal bowel sounds, soft, nontender, bowel sounds active   M/S:   limited movement   SKIN:  no rashes to exposed skin.   NEURO:   intact   PSYCH:  oriented X 2,       Lab/Diagnostic data:  Recent labs in [facility] reviewed by me today.     Last Comprehensive Metabolic Panel:  Lab Results   Component Value Date     12/18/2024    POTASSIUM 4.7 12/18/2024    CHLORIDE 103 12/18/2024    CO2 16 (L) 12/18/2024    ANIONGAP 19 (H) 12/18/2024     (H) 12/18/2024    BUN 36.8 (H) 12/18/2024    CR 1.93 (H) 12/18/2024    GFRESTIMATED 36 (L) 12/18/2024    MILLY 9.2 12/18/2024       Lab Results   Component Value Date    WBC 11.8 12/16/2024     Lab Results   Component Value Date    RBC 4.47 12/16/2024     Lab Results   Component Value Date    HGB 13.6 12/16/2024     Lab Results   Component Value Date    HCT 41.5 12/16/2024     No components found for: \"MCT\"  Lab Results   Component Value Date    MCV 93 12/16/2024     Lab Results   Component Value Date    MCH 30.4 12/16/2024     Lab Results   Component Value Date    MCHC 32.8 12/16/2024     Lab Results   Component Value Date    RDW 16.7 12/16/2024     Lab Results   Component Value Date     12/16/2024       ASSESSMENT/PLAN:    (J96.21) Acute on chronic respiratory failure with hypoxia (H)  (primary encounter diagnosis)  (J44.9) Chronic obstructive pulmonary disease, unspecified COPD type (H)  Patient refusing to wear oxygen, keeps taking off. Difficulty breathing  Send to ED for evaluation, patient is full code    (I61.9) Intraparenchymal hemorrhage of brain (H)  Right temporal intraparenchymal hemorrhage with no underlying pathology identified status post EVD placement and subsequent removal  Discharged with outpatient follow-up with repeat imaging with neurosurgery as scheduled  All antiplatelet and anticoagulant medications are " recommended to be held  Seizure prophylaxis on Keppra          Electronically signed by:  Lana Woodson CNP       Total time greater than 45 minutes reviewing chart in EPIC and PCC, medications, labs, vitals. Discussing with social work, physical therapy, occupational therapy and nursing.

## 2025-01-02 DIAGNOSIS — Z09 HOSPITAL DISCHARGE FOLLOW-UP: ICD-10-CM
